# Patient Record
Sex: FEMALE | Race: WHITE | NOT HISPANIC OR LATINO | URBAN - METROPOLITAN AREA
[De-identification: names, ages, dates, MRNs, and addresses within clinical notes are randomized per-mention and may not be internally consistent; named-entity substitution may affect disease eponyms.]

---

## 2018-03-21 ENCOUNTER — APPOINTMENT (OUTPATIENT)
Dept: URBAN - METROPOLITAN AREA CLINIC 282 | Age: 53
Setting detail: DERMATOLOGY
End: 2018-03-22

## 2018-03-21 DIAGNOSIS — D22 MELANOCYTIC NEVI: ICD-10-CM

## 2018-03-21 DIAGNOSIS — L65.9 NONSCARRING HAIR LOSS, UNSPECIFIED: ICD-10-CM

## 2018-03-21 DIAGNOSIS — L57.8 OTHER SKIN CHANGES DUE TO CHRONIC EXPOSURE TO NONIONIZING RADIATION: ICD-10-CM

## 2018-03-21 PROBLEM — D22.4 MELANOCYTIC NEVI OF SCALP AND NECK: Status: ACTIVE | Noted: 2018-03-21

## 2018-03-21 PROCEDURE — OTHER OTHER: OTHER

## 2018-03-21 PROCEDURE — 99203 OFFICE O/P NEW LOW 30 MIN: CPT | Mod: 25

## 2018-03-21 PROCEDURE — OTHER COUNSELING: OTHER

## 2018-03-21 PROCEDURE — OTHER MIPS QUALITY: OTHER

## 2018-03-21 PROCEDURE — 11100: CPT

## 2018-03-21 PROCEDURE — OTHER BIOPSY BY PUNCH METHOD: OTHER

## 2018-03-21 ASSESSMENT — LOCATION ZONE DERM
LOCATION ZONE: NECK
LOCATION ZONE: FACE
LOCATION ZONE: SCALP

## 2018-03-21 ASSESSMENT — LOCATION DETAILED DESCRIPTION DERM
LOCATION DETAILED: INFERIOR MID FOREHEAD
LOCATION DETAILED: LEFT CENTRAL FRONTAL SCALP
LOCATION DETAILED: RIGHT INFERIOR ANTERIOR NECK

## 2018-03-21 ASSESSMENT — LOCATION SIMPLE DESCRIPTION DERM
LOCATION SIMPLE: RIGHT ANTERIOR NECK
LOCATION SIMPLE: INFERIOR FOREHEAD
LOCATION SIMPLE: LEFT SCALP

## 2018-03-21 NOTE — PROCEDURE: BIOPSY BY PUNCH METHOD
Biopsy Type: H and E
X Size Of Lesion In Cm (Optional): 0
Dressing: bandage
Anesthesia Type: 2% lidocaine with epinephrine
Epidermal Sutures: 4-0 Nylon
Suture Removal: 14 days
Bill 54969 For Specimen Handling/Conveyance To Laboratory?: no
Notification Instructions: Patient will be notified of biopsy results. However, patient instructed to call the office if not contacted within 2 weeks.
Hemostasis: None
Billing Type: Third-Party Bill
Consent: Written consent was obtained and risks were reviewed including but not limited to scarring, infection, bleeding, scabbing, incomplete removal, nerve damage and allergy to anesthesia.
Post-Care Instructions: I reviewed with the patient in detail post-care instructions. Patient is to keep the biopsy site dry overnight, and then apply bacitracin twice daily until healed. Patient may apply hydrogen peroxide soaks to remove any crusting.
Anesthesia Volume In Cc (Will Not Render If 0): 0.5
Home Suture Removal Text: Patient was provided a home suture removal kit and will remove their sutures at home.  If they have any questions or difficulties they will call the office.
Punch Size In Mm: 3
Detail Level: Detailed
Wound Care: Aquaphor

## 2018-03-21 NOTE — HPI: HAIR LOSS
How Did The Hair Loss Occur?: sudden in onset
How Severe Is Your Hair Loss?: moderate
What Hair Products Do You Use?: Switched to a baby shampoo when started to lose hair. Was using a professional brand.

## 2018-03-21 NOTE — PROCEDURE: OTHER
Note Text (......Xxx Chief Complaint.): This diagnosis correlates with the
Detail Level: Detailed
Other (Free Text): Labs ordered: CBC with diff, Dhea, Testosterone free and total, LIEN screen, Vit D total, ferritin, TIBC & iron, TSH

## 2018-04-13 ENCOUNTER — APPOINTMENT (OUTPATIENT)
Dept: URBAN - METROPOLITAN AREA CLINIC 282 | Age: 53
Setting detail: DERMATOLOGY
End: 2018-04-13

## 2018-04-13 DIAGNOSIS — L65.0 TELOGEN EFFLUVIUM: ICD-10-CM

## 2018-04-13 PROCEDURE — OTHER OTHER: OTHER

## 2018-04-13 PROCEDURE — 99213 OFFICE O/P EST LOW 20 MIN: CPT

## 2018-04-13 PROCEDURE — OTHER COUNSELING: OTHER

## 2018-04-13 ASSESSMENT — LOCATION ZONE DERM: LOCATION ZONE: SCALP

## 2018-04-13 ASSESSMENT — LOCATION DETAILED DESCRIPTION DERM: LOCATION DETAILED: LEFT SUPERIOR PARIETAL SCALP

## 2018-04-13 ASSESSMENT — LOCATION SIMPLE DESCRIPTION DERM: LOCATION SIMPLE: SCALP

## 2018-04-13 NOTE — PROCEDURE: OTHER
Other (Free Text): Reviewed labs and path report.\\nPath report confirms telogen effluvium a temporary shedding of hair.\\nGiven written info on TE from [facility].
Detail Level: Detailed
Note Text (......Xxx Chief Complaint.): This diagnosis correlates with the

## 2020-10-15 ENCOUNTER — OFFICE VISIT (OUTPATIENT)
Dept: URBAN - METROPOLITAN AREA CLINIC 27 | Facility: CLINIC | Age: 55
End: 2020-10-15
Payer: COMMERCIAL

## 2020-10-15 DIAGNOSIS — H35.371 PUCKERING OF MACULA, RIGHT EYE: ICD-10-CM

## 2020-10-15 PROCEDURE — 92014 COMPRE OPH EXAM EST PT 1/>: CPT | Performed by: OPHTHALMOLOGY

## 2020-10-15 PROCEDURE — 92134 CPTRZ OPH DX IMG PST SGM RTA: CPT | Performed by: OPHTHALMOLOGY

## 2020-10-15 ASSESSMENT — INTRAOCULAR PRESSURE
OD: 15
OS: 17

## 2020-10-15 NOTE — IMPRESSION/PLAN
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 HLA-B27 positive Ankylosing spondylitis
on Humira Plan: Pt lost to f/u for 9 months. Exam shows no intraocular inflammation OU. OCT shows stable ERM/LMH OD - minimally symptomatic with distortion. Recommend observation. Cont AFTs PRN  and f/u with Dr. Lynnette Orellana for glaucoma management. Pt to call with any new visual changes.  

RTC 6 months OCT OU

## 2021-09-15 ENCOUNTER — OFFICE VISIT (OUTPATIENT)
Dept: URBAN - METROPOLITAN AREA CLINIC 41 | Facility: CLINIC | Age: 56
End: 2021-09-15
Payer: COMMERCIAL

## 2021-09-15 DIAGNOSIS — H25.13 AGE-RELATED NUCLEAR CATARACT, BILATERAL: ICD-10-CM

## 2021-09-15 DIAGNOSIS — H20.13 CHRONIC IRIDOCYCLITIS, BILATERAL: Primary | ICD-10-CM

## 2021-09-15 PROCEDURE — 92134 CPTRZ OPH DX IMG PST SGM RTA: CPT | Performed by: OPHTHALMOLOGY

## 2021-09-15 PROCEDURE — 99214 OFFICE O/P EST MOD 30 MIN: CPT | Performed by: OPHTHALMOLOGY

## 2021-09-15 RX ORDER — PREDNISOLONE ACETATE 10 MG/ML
1 % SUSPENSION/ DROPS OPHTHALMIC
Qty: 10 | Refills: 3 | Status: ACTIVE
Start: 2021-09-15

## 2021-09-15 ASSESSMENT — INTRAOCULAR PRESSURE
OD: 12
OS: 17

## 2021-09-15 NOTE — IMPRESSION/PLAN
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 HLA-B27 positive Ankylosing spondylitis
on Humira Plan: Exam shows recurrent AC inflammation OD. OCT shows stable ERM/LMH OD - minimally symptomatic with distortion. No CME. Recommend restart PF QID OD. Monitor closely. Cont AFTs PRN  and f/u with Dr. Ruthann Kang for glaucoma management. Pt to call with any new visual changes.  

RTC 2 weeks for DFE OD

## 2021-09-30 ENCOUNTER — OFFICE VISIT (OUTPATIENT)
Dept: URBAN - METROPOLITAN AREA CLINIC 41 | Facility: CLINIC | Age: 56
End: 2021-09-30
Payer: COMMERCIAL

## 2021-09-30 PROCEDURE — 92134 CPTRZ OPH DX IMG PST SGM RTA: CPT | Performed by: OPHTHALMOLOGY

## 2021-09-30 PROCEDURE — 99213 OFFICE O/P EST LOW 20 MIN: CPT | Performed by: OPHTHALMOLOGY

## 2021-09-30 ASSESSMENT — INTRAOCULAR PRESSURE
OD: 18
OS: 17

## 2021-09-30 NOTE — IMPRESSION/PLAN
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 HLA-B27 positive Ankylosing spondylitis
on Humira Plan: Exam shows resolved AC inflammation OD. OCT shows stable ERM/LMH OD - minimally symptomatic with distortion. No CME. Recommend taper PF to TID x 2 weeks, then BID x 2 weeks. Monitor closely. Cont AFTs PRN  and f/u with Dr. Nas Gooden for glaucoma management. Pt to call with any new visual changes.  

RTC 4 weeks for DFE/OCT OD

## 2023-03-10 ENCOUNTER — OFFICE VISIT (OUTPATIENT)
Dept: URBAN - METROPOLITAN AREA CLINIC 41 | Facility: CLINIC | Age: 58
End: 2023-03-10
Payer: COMMERCIAL

## 2023-03-10 DIAGNOSIS — H20.13 CHRONIC IRIDOCYCLITIS, BILATERAL: Primary | ICD-10-CM

## 2023-03-10 DIAGNOSIS — Z96.1 PRESENCE OF INTRAOCULAR LENS: ICD-10-CM

## 2023-03-10 DIAGNOSIS — H35.371 PUCKERING OF MACULA, RIGHT EYE: ICD-10-CM

## 2023-03-10 DIAGNOSIS — H25.12 AGE-RELATED NUCLEAR CATARACT, LEFT EYE: ICD-10-CM

## 2023-03-10 PROCEDURE — 92134 CPTRZ OPH DX IMG PST SGM RTA: CPT | Performed by: OPHTHALMOLOGY

## 2023-03-10 PROCEDURE — 99214 OFFICE O/P EST MOD 30 MIN: CPT | Performed by: OPHTHALMOLOGY

## 2023-03-10 RX ORDER — PREDNISOLONE ACETATE 10 MG/ML
1 % SUSPENSION/ DROPS OPHTHALMIC
Qty: 10 | Refills: 3 | Status: ACTIVE
Start: 2023-03-10

## 2023-03-10 ASSESSMENT — INTRAOCULAR PRESSURE
OS: 15
OD: 14

## 2023-03-10 NOTE — IMPRESSION/PLAN
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 HLA-B27 positive Ankylosing spondylitis - h/o Retisert OD
on Humira Plan: Patient presents with ache OD. Exam demonstrates minimal inflammation. Unclear if issues are related to inflammation or corneal edema. Rec restart PF BID. [id number] Magaly Rios for patient to self taper. Will start [facility] 128. 

Rec 4-6 wks with OCT OU

## 2024-05-23 ENCOUNTER — OFFICE VISIT (OUTPATIENT)
Dept: URBAN - METROPOLITAN AREA CLINIC 41 | Facility: CLINIC | Age: 59
End: 2024-05-23
Payer: COMMERCIAL

## 2024-05-23 DIAGNOSIS — H35.371 PUCKERING OF MACULA, RIGHT EYE: ICD-10-CM

## 2024-05-23 DIAGNOSIS — H20.13 CHRONIC IRIDOCYCLITIS, BILATERAL: Primary | ICD-10-CM

## 2024-05-23 DIAGNOSIS — Z96.1 PRESENCE OF INTRAOCULAR LENS: ICD-10-CM

## 2024-05-23 DIAGNOSIS — H25.12 AGE-RELATED NUCLEAR CATARACT, LEFT EYE: ICD-10-CM

## 2024-05-23 PROCEDURE — 92134 CPTRZ OPH DX IMG PST SGM RTA: CPT | Performed by: OPHTHALMOLOGY

## 2024-05-23 PROCEDURE — 92014 COMPRE OPH EXAM EST PT 1/>: CPT | Performed by: OPHTHALMOLOGY

## 2024-05-23 RX ORDER — PREDNISOLONE ACETATE 10 MG/ML
1 % SUSPENSION/ DROPS OPHTHALMIC
Qty: 10 | Refills: 3 | Status: ACTIVE
Start: 2024-05-23

## 2024-05-23 ASSESSMENT — INTRAOCULAR PRESSURE
OS: 20
OD: 21

## 2025-08-27 ENCOUNTER — OFFICE VISIT (OUTPATIENT)
Dept: URBAN - METROPOLITAN AREA CLINIC 27 | Facility: CLINIC | Age: 60
End: 2025-08-27
Payer: COMMERCIAL

## 2025-08-27 DIAGNOSIS — Z96.1 PRESENCE OF INTRAOCULAR LENS: ICD-10-CM

## 2025-08-27 DIAGNOSIS — H35.371 PUCKERING OF MACULA, RIGHT EYE: ICD-10-CM

## 2025-08-27 DIAGNOSIS — H20.13 CHRONIC IRIDOCYCLITIS, BILATERAL: Primary | ICD-10-CM

## 2025-08-27 DIAGNOSIS — H25.12 AGE-RELATED NUCLEAR CATARACT, LEFT EYE: ICD-10-CM

## 2025-08-27 PROCEDURE — 92134 CPTRZ OPH DX IMG PST SGM RTA: CPT | Performed by: OPHTHALMOLOGY

## 2025-08-27 PROCEDURE — 99214 OFFICE O/P EST MOD 30 MIN: CPT | Performed by: OPHTHALMOLOGY

## 2025-08-27 RX ORDER — PREDNISOLONE ACETATE 10 MG/ML
1 % SUSPENSION/ DROPS OPHTHALMIC
Qty: 5 | Refills: 3 | Status: ACTIVE
Start: 2025-08-27

## 2025-08-27 ASSESSMENT — INTRAOCULAR PRESSURE
OD: 11
OS: 11